# Patient Record
Sex: MALE | Employment: UNEMPLOYED | URBAN - METROPOLITAN AREA
[De-identification: names, ages, dates, MRNs, and addresses within clinical notes are randomized per-mention and may not be internally consistent; named-entity substitution may affect disease eponyms.]

---

## 2018-12-03 ENCOUNTER — HOSPITAL ENCOUNTER (OUTPATIENT)
Dept: PHYSICAL THERAPY | Age: 73
Setting detail: THERAPIES SERIES
Discharge: HOME | End: 2018-12-03
Payer: OTHER GOVERNMENT

## 2018-12-03 PROCEDURE — 97162 PT EVAL MOD COMPLEX 30 MIN: CPT

## 2018-12-03 ASSESSMENT — PAIN DESCRIPTION - PAIN TYPE: TYPE: CHRONIC PAIN

## 2018-12-03 ASSESSMENT — PAIN DESCRIPTION - LOCATION: LOCATION: BACK

## 2018-12-03 ASSESSMENT — PAIN SCALES - GENERAL: PAINLEVEL_OUTOF10: 1

## 2018-12-03 ASSESSMENT — PAIN DESCRIPTION - ORIENTATION: ORIENTATION: LOWER

## 2018-12-03 ASSESSMENT — PAIN DESCRIPTION - DESCRIPTORS: DESCRIPTORS: ACHING

## 2018-12-07 ENCOUNTER — HOSPITAL ENCOUNTER (OUTPATIENT)
Dept: PHYSICAL THERAPY | Age: 73
Setting detail: THERAPIES SERIES
Discharge: HOME | End: 2018-12-07
Payer: OTHER GOVERNMENT

## 2018-12-07 PROCEDURE — 97113 AQUATIC THERAPY/EXERCISES: CPT

## 2018-12-07 ASSESSMENT — PAIN DESCRIPTION - LOCATION: LOCATION: BACK

## 2018-12-07 ASSESSMENT — PAIN SCALES - GENERAL: PAINLEVEL_OUTOF10: 5

## 2018-12-07 ASSESSMENT — PAIN DESCRIPTION - FREQUENCY: FREQUENCY: CONTINUOUS

## 2018-12-07 ASSESSMENT — PAIN DESCRIPTION - DESCRIPTORS: DESCRIPTORS: ACHING

## 2018-12-07 ASSESSMENT — PAIN DESCRIPTION - ORIENTATION: ORIENTATION: LOWER

## 2018-12-07 ASSESSMENT — PAIN DESCRIPTION - PAIN TYPE: TYPE: CHRONIC PAIN

## 2018-12-07 NOTE — PROGRESS NOTES
[address] [address]  Outpatient Physical Therapy    Treatment Note        Date: 2018  Patient: Yamile Ng  : 1945  ACCT #: [de-identified]  Referring Practitioner: Poncho Nicole NP  Diagnosis: Low back pain M54.5    Visit Information:  PT Visit Information  PT Insurance Information: [employer]  Total # of Visits Approved: 9 (1 eval and 8 PT/Aquatic only)  Total # of Visits to Date: 2  No Show: 0  Canceled Appointment: 0  Progress Note Counter:     Subjective: Pt states \"My back always hurts\"  Comments: Provide close supervision/consider therapist in pool during treatment  HEP Compliance:  [] Good [] [address] [] Poor [] Reports not doing due to:    Vital Signs  Patient Currently in Pain: Yes   Pain Screening  Patient Currently in Pain: Yes  Pain Assessment  Pain Assessment: 0-10  Pain Level: 5  Pain Type: Chronic pain  Pain Location: Back  Pain Orientation: Lower  Pain Descriptors: Aching  Pain Frequency: Continuous    OBJECTIVE:   Exercises  Exercise 1: Aquatic exercises:  Exercise 2: ambulation F/L/R x 3  Exercise 3: sink exercises x10  Exercise 4: hamstring stretch*  Exercise 5: hip circles*  Exercise 6: standing back extension*  Exercise 7: lumbar rotation with KB*  Exercise 11: VC and demo for proper exercise performance, close supervision       lities:        *Indicates exercise, modality, or manual techniques to be initiated when appropriate    Assessment:           Assessment: Pt requires increased time and supervision to amb to pool area d/t safety concerns. Pt aggitated with attempts to guard and safety cues. Pt amb with SPC and drop foot. Chair lift used to enter and exit pool, with Min A d/t balance deficits. Pt tolerated aquatic PRE without incident. Therapist with close supervision at [address] with vc for pt to use rails in pool. Pt required CGA to amb to changing room and required assistance to remove items from locker and carry to locker room.               Goals:       Long term

## 2018-12-12 ENCOUNTER — HOSPITAL ENCOUNTER (OUTPATIENT)
Dept: PHYSICAL THERAPY | Age: 73
Setting detail: THERAPIES SERIES
Discharge: HOME | End: 2018-12-12
Payer: OTHER GOVERNMENT

## 2018-12-12 PROCEDURE — 97113 AQUATIC THERAPY/EXERCISES: CPT

## 2018-12-12 ASSESSMENT — PAIN DESCRIPTION - LOCATION: LOCATION: BACK

## 2018-12-12 ASSESSMENT — PAIN SCALES - GENERAL: PAINLEVEL_OUTOF10: 4

## 2018-12-12 ASSESSMENT — PAIN DESCRIPTION - DESCRIPTORS: DESCRIPTORS: ACHING

## 2018-12-12 ASSESSMENT — PAIN DESCRIPTION - PAIN TYPE: TYPE: CHRONIC PAIN

## 2018-12-12 ASSESSMENT — PAIN DESCRIPTION - ORIENTATION: ORIENTATION: LOWER

## 2018-12-14 ENCOUNTER — HOSPITAL ENCOUNTER (OUTPATIENT)
Dept: PHYSICAL THERAPY | Age: 73
Setting detail: THERAPIES SERIES
Discharge: HOME | End: 2018-12-14
Payer: OTHER GOVERNMENT

## 2018-12-14 PROCEDURE — 97113 AQUATIC THERAPY/EXERCISES: CPT

## 2018-12-14 ASSESSMENT — PAIN DESCRIPTION - PAIN TYPE: TYPE: CHRONIC PAIN

## 2018-12-14 ASSESSMENT — PAIN DESCRIPTION - DESCRIPTORS: DESCRIPTORS: ACHING

## 2018-12-14 ASSESSMENT — PAIN SCALES - GENERAL: PAINLEVEL_OUTOF10: 3

## 2018-12-14 ASSESSMENT — PAIN DESCRIPTION - LOCATION: LOCATION: BACK

## 2018-12-18 ENCOUNTER — HOSPITAL ENCOUNTER (OUTPATIENT)
Dept: PHYSICAL THERAPY | Age: 73
Setting detail: THERAPIES SERIES
Discharge: HOME | End: 2018-12-18
Payer: OTHER GOVERNMENT

## 2018-12-18 PROCEDURE — 97113 AQUATIC THERAPY/EXERCISES: CPT

## 2018-12-18 ASSESSMENT — PAIN SCALES - GENERAL: PAINLEVEL_OUTOF10: 3

## 2018-12-18 ASSESSMENT — PAIN DESCRIPTION - LOCATION: LOCATION: BACK

## 2018-12-18 ASSESSMENT — PAIN DESCRIPTION - ORIENTATION: ORIENTATION: LOWER

## 2018-12-18 ASSESSMENT — PAIN DESCRIPTION - DESCRIPTORS: DESCRIPTORS: ACHING

## 2018-12-20 ENCOUNTER — HOSPITAL ENCOUNTER (OUTPATIENT)
Dept: PHYSICAL THERAPY | Age: 73
Setting detail: THERAPIES SERIES
Discharge: HOME | End: 2018-12-20
Payer: OTHER GOVERNMENT

## 2018-12-20 PROCEDURE — 97113 AQUATIC THERAPY/EXERCISES: CPT

## 2018-12-20 ASSESSMENT — PAIN DESCRIPTION - LOCATION: LOCATION: BACK

## 2018-12-20 ASSESSMENT — PAIN SCALES - GENERAL: PAINLEVEL_OUTOF10: 2

## 2018-12-20 ASSESSMENT — PAIN DESCRIPTION - PAIN TYPE: TYPE: CHRONIC PAIN

## 2018-12-20 ASSESSMENT — PAIN DESCRIPTION - ORIENTATION: ORIENTATION: LOWER;UPPER

## 2018-12-20 NOTE — PROGRESS NOTES
pain  Prognosis: Good       Goals:  Long term goals  Time Frame for Long term goals : 4 weeks  Long term goal 1: Patient will report </= 1/10 pain with walking for community distances. Long term goal 2: Patient will increase SLR >/= 10 degrees for improved functional tolerance. Long term goal 3: Patient will increase strength in bilateral LEs >/= 1/2 manual muscle grade for improved ambulation tolerance. Long term goal 4: Oswestry </= 20/50 to demonstrate improved functional tolerance. Long term goal 5: Patient will be independent with HEP. Progress toward goals: inc strength, rom, dec pain    POST-PAIN       Pain Rating (0-10 pain scale):  0 /10   Location and pain description same as pre-treatment unless indicated. Action: [] NA   [x] Perform HEP  [] Meds as prescribed  [] Modalities as prescribed   [] Call Physician     Frequency/Duration:  Plan  Plan weeks: 8 sessions of aquatic treatment total  Current Treatment Recommendations: Strengthening, Balance Training, ROM, Neuromuscular Re-education, Endurance Training, Functional Mobility Training, Gait Training, Stair training, Transfer Training, Home Exercise Program, Safety Education & Training, Aquatics, Patient/Caregiver Education & Training, Equipment Evaluation, Education, & procurement  Plan Comment: transfer care to Neuro- Particia Dion, PT     Pt to continue current HEP. See objective section for any therapeutic exercise changes, additions or modifications this date.          PT Individual Minutes  Time In: 1100  Time Out: 1140  Minutes: 40  Timed Code Treatment Minutes: 40 Minutes  Procedure Minutes: 0    Signature:  Electronically signed by Jess Orona PTA on 12/20/18 at 12:09 PM

## 2018-12-27 ENCOUNTER — HOSPITAL ENCOUNTER (OUTPATIENT)
Dept: PHYSICAL THERAPY | Age: 73
Setting detail: THERAPIES SERIES
Discharge: HOME | End: 2018-12-27
Payer: OTHER GOVERNMENT

## 2018-12-27 ENCOUNTER — APPOINTMENT (OUTPATIENT)
Dept: PHYSICAL THERAPY | Age: 73
End: 2018-12-27
Payer: OTHER GOVERNMENT

## 2018-12-27 PROCEDURE — 97113 AQUATIC THERAPY/EXERCISES: CPT

## 2018-12-27 ASSESSMENT — PAIN DESCRIPTION - DESCRIPTORS: DESCRIPTORS: ACHING

## 2018-12-27 ASSESSMENT — PAIN DESCRIPTION - ORIENTATION: ORIENTATION: LOWER

## 2018-12-27 ASSESSMENT — PAIN SCALES - GENERAL: PAINLEVEL_OUTOF10: 2

## 2018-12-27 ASSESSMENT — PAIN DESCRIPTION - PAIN TYPE: TYPE: CHRONIC PAIN

## 2018-12-27 ASSESSMENT — PAIN DESCRIPTION - LOCATION: LOCATION: BACK

## 2018-12-27 NOTE — PROGRESS NOTES
back pain  Prognosis: Good       Goals:  Long term goals  Time Frame for Long term goals : 4 weeks  Long term goal 1: Patient will report </= 1/10 pain with walking for community distances. Long term goal 2: Patient will increase SLR >/= 10 degrees for improved functional tolerance. Long term goal 3: Patient will increase strength in bilateral LEs >/= 1/2 manual muscle grade for improved ambulation tolerance. Long term goal 4: Oswestry </= 20/50 to demonstrate improved functional tolerance. Long term goal 5: Patient will be independent with HEP. Progress toward goals: inc strength, rom, dec pain    POST-PAIN       Pain Rating (0-10 pain scale):   0/10   Location and pain description same as pre-treatment unless indicated. Action: [] NA   [x] Perform HEP  [] Meds as prescribed  [] Modalities as prescribed   [] Call Physician     Frequency/Duration:  Plan  Plan weeks: 8 sessions of aquatic treatment total  Current Treatment Recommendations: Strengthening, Balance Training, ROM, Neuromuscular Re-education, Endurance Training, Functional Mobility Training, Gait Training, Stair training, Transfer Training, Home Exercise Program, Safety Education & Training, Aquatics, Patient/Caregiver Education & Training, Equipment Evaluation, Education, & procurement  Plan Comment: transfer care to [facility], PT     Pt to continue current HEP. See objective section for any therapeutic exercise changes, additions or modifications this date.          PT Individual Minutes  Time In: 1100  Time Out: 6377  Minutes: 45  Timed Code Treatment Minutes: 45 Minutes  Procedure Minutes:0    QJHOUCQT 20'    Signature:  Electronically signed by Jenifer Chambers PTA on 12/27/18 at 11:59 AM

## 2018-12-31 ENCOUNTER — HOSPITAL ENCOUNTER (OUTPATIENT)
Dept: PHYSICAL THERAPY | Age: 73
Setting detail: THERAPIES SERIES
Discharge: HOME | End: 2018-12-31
Payer: OTHER GOVERNMENT

## 2018-12-31 PROCEDURE — 97113 AQUATIC THERAPY/EXERCISES: CPT

## 2018-12-31 ASSESSMENT — PAIN SCALES - GENERAL: PAINLEVEL_OUTOF10: 2

## 2018-12-31 ASSESSMENT — PAIN DESCRIPTION - DESCRIPTORS: DESCRIPTORS: ACHING

## 2018-12-31 ASSESSMENT — PAIN DESCRIPTION - ORIENTATION: ORIENTATION: LOWER

## 2018-12-31 ASSESSMENT — PAIN DESCRIPTION - LOCATION: LOCATION: BACK

## 2019-01-03 ENCOUNTER — HOSPITAL ENCOUNTER (OUTPATIENT)
Dept: PHYSICAL THERAPY | Age: 74
Setting detail: THERAPIES SERIES
Discharge: HOME | End: 2019-01-03
Payer: OTHER GOVERNMENT

## 2019-01-03 PROCEDURE — 97113 AQUATIC THERAPY/EXERCISES: CPT

## 2019-01-03 ASSESSMENT — PAIN SCALES - GENERAL: PAINLEVEL_OUTOF10: 1

## 2019-01-03 ASSESSMENT — PAIN DESCRIPTION - LOCATION: LOCATION: BACK

## 2019-01-03 ASSESSMENT — PAIN DESCRIPTION - ORIENTATION: ORIENTATION: LOWER

## 2019-01-18 ENCOUNTER — HOSPITAL ENCOUNTER (OUTPATIENT)
Dept: PHYSICAL THERAPY | Age: 74
Setting detail: THERAPIES SERIES
Discharge: HOME | End: 2019-01-18
Payer: OTHER GOVERNMENT

## 2019-01-22 ENCOUNTER — HOSPITAL ENCOUNTER (OUTPATIENT)
Dept: PHYSICAL THERAPY | Age: 74
Setting detail: THERAPIES SERIES
Discharge: HOME | End: 2019-01-22
Payer: OTHER GOVERNMENT

## 2019-01-25 ENCOUNTER — HOSPITAL ENCOUNTER (OUTPATIENT)
Dept: PHYSICAL THERAPY | Age: 74
Setting detail: THERAPIES SERIES
Discharge: HOME | End: 2019-01-25
Payer: OTHER GOVERNMENT

## 2019-02-01 ENCOUNTER — HOSPITAL ENCOUNTER (OUTPATIENT)
Dept: PHYSICAL THERAPY | Age: 74
Setting detail: THERAPIES SERIES
Discharge: HOME | End: 2019-02-01
Payer: OTHER GOVERNMENT

## 2019-02-01 PROCEDURE — 97113 AQUATIC THERAPY/EXERCISES: CPT

## 2019-02-01 ASSESSMENT — PAIN DESCRIPTION - ORIENTATION: ORIENTATION: LOWER

## 2019-02-01 ASSESSMENT — PAIN DESCRIPTION - LOCATION: LOCATION: BACK

## 2019-02-01 ASSESSMENT — PAIN SCALES - GENERAL: PAINLEVEL_OUTOF10: 3

## 2019-03-07 ENCOUNTER — HOSPITAL ENCOUNTER (OUTPATIENT)
Dept: PHYSICAL THERAPY | Age: 74
Setting detail: THERAPIES SERIES
Discharge: HOME | End: 2019-03-07
Payer: OTHER GOVERNMENT

## 2019-03-07 PROCEDURE — 97113 AQUATIC THERAPY/EXERCISES: CPT

## 2019-03-07 ASSESSMENT — PAIN SCALES - GENERAL: PAINLEVEL_OUTOF10: 0

## 2019-03-28 ENCOUNTER — HOSPITAL ENCOUNTER (OUTPATIENT)
Dept: PHYSICAL THERAPY | Age: 74
Setting detail: THERAPIES SERIES
Discharge: HOME | End: 2019-03-28
Payer: OTHER GOVERNMENT

## 2019-03-28 PROCEDURE — 97113 AQUATIC THERAPY/EXERCISES: CPT

## 2019-03-28 ASSESSMENT — PAIN SCALES - GENERAL: PAINLEVEL_OUTOF10: 2

## 2019-04-01 NOTE — PROGRESS NOTES
Therapy                            Cancellation/No-show Note      Date:  2019  Patient Name:  Josselin Smith  :  1945   MRN:  [id number]          Visit Information:  PT Visit Information  PT Insurance Information: [employer]  Total # of Visits Approved: 6(6 additional approved until 19)  Total # of Visits to Date: 4  No Show: 0  Canceled Appointment: 2  Progress Note Counter: 3/6 (cx 4/3/19)    For today's appointment patient:  [x]  Cancelled  [x]  Rescheduled appointment  []  No-show   []  Called pt to remind of next appointment     Reason given by patient:  []  Patient ill  [x]  Conflicting appointment  []  No transportation    []  Conflict with work  []  No reason given  []  Other:      [x] Pt has future appointments scheduled, no follow up needed  [] Pt requests to be on hold.     Reason:   If > 2 weeks please discuss with therapist.  [] Therapist to call pt for follow up     Comments:       Signature: Electronically signed by Vicky Strong PT on 19 at 9:27 AM

## 2019-04-03 ENCOUNTER — HOSPITAL ENCOUNTER (OUTPATIENT)
Dept: PHYSICAL THERAPY | Age: 74
Setting detail: THERAPIES SERIES
Discharge: HOME | End: 2019-04-03
Payer: MEDICARE

## 2019-04-05 ENCOUNTER — HOSPITAL ENCOUNTER (OUTPATIENT)
Dept: PHYSICAL THERAPY | Age: 74
Setting detail: THERAPIES SERIES
Discharge: HOME | End: 2019-04-05
Payer: MEDICARE

## 2019-04-05 NOTE — PROGRESS NOTES
[facility]       Physical Therapy  Cancellation/No-show Note  Patient Name:  Abby Fuchs  :  1945   Date:  2019  Referring Practitioner: Bj Trent NP  Diagnosis: Low back pain M54.5    Visit Information:  PT Visit Information  PT Insurance Information: [employer]  Total # of Visits Approved: 6(6 additional approved until 19)  Total # of Visits to Date: 4  No Show: 0  Canceled Appointment: 3  Progress Note Counter: 3/6 (cx 19)    For today's appointment patient:  [x]  Cancelled  []  Rescheduled appointment  []  No-show   []  Called pt to remind of next appointment     Reason given by patient:  []  Patient ill  []  Conflicting appointment  []  No transportation    []  Conflict with work  [x]  No reason given  []  Other:       Comments:       Signature: Electronically signed by Pepe Newton PTA on 19 at 1:00 PM

## 2019-04-05 NOTE — PROGRESS NOTES
[facility]    [x]  [address]  []  [address]      [address]     [address]  Ph: [phone]     Ph: [phone]  Fax: [phone]     Fax: [phone]    [] Certification  [] Recertification []  Plan of Care  [] Progress Note [x] Discharge    Date: 2019  Patient Name: Jessie Che  : 1945  MRN: [id number]    To:  Referring Practitioner: Marva Johnson NP    From: Vasiliy Pena, PT, DPT      [x]    FYI:    Pt to be discharged at this time. Pt has reached end of insurance approved date range. Pt with inconsistent attendance since beginning aquatic therapy. [x]   Please see last POC/ Progress Report for last measured functional status. Thank you for your referral and the opportunity to treat this patient. Please contact us with any questions or concerns.     Electronically signed by Sonu Munoz PTA on 2019 at 1:09 PM  Electronically signed by Vasiliy Pena PT on 2019 at 1:38 PM

## 2019-06-13 ENCOUNTER — OFFICE VISIT (OUTPATIENT)
Dept: GASTROENTEROLOGY | Age: 74
End: 2019-06-13
Payer: MEDICARE

## 2019-06-13 VITALS
BODY MASS INDEX: 24.11 KG/M2 | HEIGHT: 72 IN | WEIGHT: 178 LBS | OXYGEN SATURATION: 97 % | HEART RATE: 87 BPM | RESPIRATION RATE: 16 BRPM

## 2019-06-13 DIAGNOSIS — K26.9 DUODENAL ULCER: Primary | ICD-10-CM

## 2019-06-13 PROCEDURE — 99212 OFFICE O/P EST SF 10 MIN: CPT | Performed by: SPECIALIST

## 2019-06-13 ASSESSMENT — ENCOUNTER SYMPTOMS
ABDOMINAL PAIN: 0
NAUSEA: 0
BLOOD IN STOOL: 0
RECTAL PAIN: 0
RESPIRATORY NEGATIVE: 1
CONSTIPATION: 0
DIARRHEA: 0
ABDOMINAL DISTENTION: 0
GASTROINTESTINAL NEGATIVE: 1
ANAL BLEEDING: 0
EYES NEGATIVE: 1
VOMITING: 0

## 2019-06-13 NOTE — PROGRESS NOTES
Gastroenterology Clinic Follow up Visit    Jessie [facility]  [id number]  Chief Complaint   Patient presents with    Follow-up       HPI and A/P at last visit summarized below:  Patient is here for follow-up. Was admitted to [facility] because of melena and had upper endoscopy which showed mild gastritis of the antrum and biopsies were unremarkable. Patient also had a nonbleeding ulcer in the duodenal bulb. Biopsies did not show H. pylori gastritis. Patient is on Protonix. Not passing any more black stools. No nausea or emesis. Has symptoms of mild GERD. Patient had a colonoscopy few years ago at the [place] and it was unremarkable. Review of Systems   Constitutional: Negative. HENT: Negative. Eyes: Negative. Respiratory: Negative. Gastrointestinal: Negative. Negative for abdominal distention, abdominal pain, anal bleeding, blood in stool, constipation, diarrhea, nausea, rectal pain and vomiting. Genitourinary: Negative. Musculoskeletal: Negative. Neurological: Negative. Psychiatric/Behavioral: Negative. Past medical history, past surgical history, medication list, social and familyhistory reviewed    Pulse 87, resp. rate 16, height 6' (1.829 m), weight 178 lb (80.7 kg), SpO2 97 %. Physical Exam   Constitutional: He appears well-developed and well-nourished. HENT:   Head: Normocephalic. Mouth/Throat: Oropharynx is clear and moist.   Eyes: Pupils are equal, round, and reactive to light. Cardiovascular: Normal rate, regular rhythm, normal heart sounds and intact distal pulses. Pulmonary/Chest: Effort normal and breath sounds normal.   Abdominal: Soft. Bowel sounds are normal.   Neurological: He is alert. Skin: Skin is warm and dry. Psychiatric: He has a normal mood and affect.        Laboratory, Pathology, Radiology reviewed in detail with relevantimportant investigations summarized below:    Recent Labs     05/17/19  0735 05/15/19  0710   WBC 3.2* 3.6* HGB 10.3* 10.1*   HCT 31.7* 31.1*   MCV 91.2 90.8    233     Lab Results   Component Value Date    ALT 11 03/04/2014    AST 13 03/04/2014    ALKPHOS 89 03/04/2014    BILITOT 0.3 03/04/2014     No results found. Endoscopic investigations:     Assessment and Plan:  Kriss Obrien 68 y.o. male for follow up. History of bleeding duodenal ulcer. Has not had any more bleeding lately. Is on Protonix. ,  Resume 81 mg of aspirin now because of his underlying cardiac issues. Continue Protonix, follow-up with his PCP. Diagnosis Orders   1. Duodenal ulcer         Return if symptoms worsen or fail to improve, for Follow Up Visit. Corky Berg MD   StaffGastroenterologist  [facility]    Please note this report has been partially produced using speech recognitionsoftware  and may cause contain errors related to that system including grammar, punctuation and spelling as well as words andphrases that may seem inappropriate. If there are questions or concerns please feel free to contact me to clarify.